# Patient Record
Sex: MALE | Race: WHITE | NOT HISPANIC OR LATINO | Employment: FULL TIME | URBAN - METROPOLITAN AREA
[De-identification: names, ages, dates, MRNs, and addresses within clinical notes are randomized per-mention and may not be internally consistent; named-entity substitution may affect disease eponyms.]

---

## 2023-11-24 ENCOUNTER — HOSPITAL ENCOUNTER (EMERGENCY)
Facility: HOSPITAL | Age: 58
Discharge: HOME | End: 2023-11-24
Attending: EMERGENCY MEDICINE | Admitting: INTERNAL MEDICINE
Payer: COMMERCIAL

## 2023-11-24 VITALS
HEIGHT: 74 IN | RESPIRATION RATE: 20 BRPM | SYSTOLIC BLOOD PRESSURE: 137 MMHG | WEIGHT: 195 LBS | OXYGEN SATURATION: 95 % | BODY MASS INDEX: 25.03 KG/M2 | DIASTOLIC BLOOD PRESSURE: 78 MMHG | HEART RATE: 102 BPM | TEMPERATURE: 98 F

## 2023-11-24 DIAGNOSIS — R33.8 ACUTE URINARY RETENTION: Primary | ICD-10-CM

## 2023-11-24 DIAGNOSIS — R07.9 CHEST PAIN: ICD-10-CM

## 2023-11-24 DIAGNOSIS — R10.9 ABDOMINAL PAIN: ICD-10-CM

## 2023-11-24 DIAGNOSIS — R10.2 PELVIC PAIN: ICD-10-CM

## 2023-11-24 LAB
ALBUMIN SERPL BCP-MCNC: 4.3 G/DL (ref 3.5–5.2)
ALBUMIN SERPL BCP-MCNC: 4.6 G/DL (ref 3.5–5.2)
ALP SERPL-CCNC: 78 U/L (ref 55–135)
ALP SERPL-CCNC: 83 U/L (ref 55–135)
ALT SERPL W/O P-5'-P-CCNC: 32 U/L (ref 10–44)
ALT SERPL W/O P-5'-P-CCNC: 35 U/L (ref 10–44)
AMPHET+METHAMPHET UR QL: NEGATIVE
ANION GAP SERPL CALC-SCNC: 12 MMOL/L (ref 8–16)
ANION GAP SERPL CALC-SCNC: 13 MMOL/L (ref 8–16)
AST SERPL-CCNC: 26 U/L (ref 10–40)
AST SERPL-CCNC: 26 U/L (ref 10–40)
BACTERIA #/AREA URNS HPF: NEGATIVE /HPF
BARBITURATES UR QL SCN>200 NG/ML: NEGATIVE
BASOPHILS # BLD AUTO: 0.05 K/UL (ref 0–0.2)
BASOPHILS NFR BLD: 0.5 % (ref 0–1.9)
BENZODIAZ UR QL SCN>200 NG/ML: NEGATIVE
BILIRUB SERPL-MCNC: 0.3 MG/DL (ref 0.1–1)
BILIRUB SERPL-MCNC: 0.5 MG/DL (ref 0.1–1)
BILIRUB UR QL STRIP: NEGATIVE
BNP SERPL-MCNC: 39 PG/ML (ref 0–99)
BUN SERPL-MCNC: 20 MG/DL (ref 6–20)
BUN SERPL-MCNC: 21 MG/DL (ref 6–20)
BZE UR QL SCN: NEGATIVE
CALCIUM SERPL-MCNC: 10.1 MG/DL (ref 8.7–10.5)
CALCIUM SERPL-MCNC: 9.3 MG/DL (ref 8.7–10.5)
CANNABINOIDS UR QL SCN: NEGATIVE
CHLORIDE SERPL-SCNC: 108 MMOL/L (ref 95–110)
CHLORIDE SERPL-SCNC: 111 MMOL/L (ref 95–110)
CK SERPL-CCNC: 66 U/L (ref 20–200)
CLARITY UR: CLEAR
CO2 SERPL-SCNC: 18 MMOL/L (ref 23–29)
CO2 SERPL-SCNC: 18 MMOL/L (ref 23–29)
COLOR UR: YELLOW
CREAT SERPL-MCNC: 0.9 MG/DL (ref 0.5–1.4)
CREAT SERPL-MCNC: 0.9 MG/DL (ref 0.5–1.4)
CREAT SERPL-MCNC: 1.1 MG/DL (ref 0.5–1.4)
CREAT UR-MCNC: 140.4 MG/DL (ref 23–375)
DIFFERENTIAL METHOD: ABNORMAL
EOSINOPHIL # BLD AUTO: 0.1 K/UL (ref 0–0.5)
EOSINOPHIL NFR BLD: 0.7 % (ref 0–8)
ERYTHROCYTE [DISTWIDTH] IN BLOOD BY AUTOMATED COUNT: 12.5 % (ref 11.5–14.5)
EST. GFR  (NO RACE VARIABLE): >60 ML/MIN/1.73 M^2
EST. GFR  (NO RACE VARIABLE): >60 ML/MIN/1.73 M^2
GLUCOSE SERPL-MCNC: 115 MG/DL (ref 70–110)
GLUCOSE SERPL-MCNC: 130 MG/DL (ref 70–110)
GLUCOSE UR QL STRIP: NEGATIVE
HCT VFR BLD AUTO: 43.2 % (ref 40–54)
HGB BLD-MCNC: 14.7 G/DL (ref 14–18)
HGB UR QL STRIP: ABNORMAL
HYALINE CASTS #/AREA URNS LPF: 2 /LPF
IMM GRANULOCYTES # BLD AUTO: 0.07 K/UL (ref 0–0.04)
IMM GRANULOCYTES NFR BLD AUTO: 0.7 % (ref 0–0.5)
INFLUENZA A, MOLECULAR: NEGATIVE
INFLUENZA B, MOLECULAR: NEGATIVE
KETONES UR QL STRIP: NEGATIVE
LACTATE SERPL-SCNC: 1.9 MMOL/L (ref 0.5–1.9)
LACTATE SERPL-SCNC: 2.3 MMOL/L (ref 0.5–1.9)
LEUKOCYTE ESTERASE UR QL STRIP: NEGATIVE
LIPASE SERPL-CCNC: 20 U/L (ref 4–60)
LYMPHOCYTES # BLD AUTO: 0.9 K/UL (ref 1–4.8)
LYMPHOCYTES NFR BLD: 9.2 % (ref 18–48)
MAGNESIUM SERPL-MCNC: 1.9 MG/DL (ref 1.6–2.6)
MCH RBC QN AUTO: 29.8 PG (ref 27–31)
MCHC RBC AUTO-ENTMCNC: 34 G/DL (ref 32–36)
MCV RBC AUTO: 88 FL (ref 82–98)
MICROSCOPIC COMMENT: ABNORMAL
MONOCYTES # BLD AUTO: 0.5 K/UL (ref 0.3–1)
MONOCYTES NFR BLD: 4.7 % (ref 4–15)
NEUTROPHILS # BLD AUTO: 8.5 K/UL (ref 1.8–7.7)
NEUTROPHILS NFR BLD: 84.2 % (ref 38–73)
NITRITE UR QL STRIP: NEGATIVE
NRBC BLD-RTO: 0 /100 WBC
OPIATES UR QL SCN: ABNORMAL
PCP UR QL SCN>25 NG/ML: NEGATIVE
PH UR STRIP: 6 [PH] (ref 5–8)
PLATELET # BLD AUTO: 253 K/UL (ref 150–450)
PMV BLD AUTO: 10.6 FL (ref 9.2–12.9)
POTASSIUM SERPL-SCNC: 3.8 MMOL/L (ref 3.5–5.1)
POTASSIUM SERPL-SCNC: 3.9 MMOL/L (ref 3.5–5.1)
PROT SERPL-MCNC: 7 G/DL (ref 6–8.4)
PROT SERPL-MCNC: 7.7 G/DL (ref 6–8.4)
PROT UR QL STRIP: ABNORMAL
RBC # BLD AUTO: 4.93 M/UL (ref 4.6–6.2)
RBC #/AREA URNS HPF: 19 /HPF (ref 0–4)
SAMPLE: NORMAL
SODIUM SERPL-SCNC: 139 MMOL/L (ref 136–145)
SODIUM SERPL-SCNC: 141 MMOL/L (ref 136–145)
SP GR UR STRIP: >1.03 (ref 1–1.03)
SPECIMEN SOURCE: NORMAL
SQUAMOUS #/AREA URNS HPF: 0 /HPF
TOXICOLOGY INFORMATION: ABNORMAL
TROPONIN I SERPL HS-MCNC: 2.6 PG/ML (ref 0–14.9)
TROPONIN I SERPL HS-MCNC: 3.5 PG/ML (ref 0–14.9)
TSH SERPL DL<=0.005 MIU/L-ACNC: 0.84 UIU/ML (ref 0.34–5.6)
URN SPEC COLLECT METH UR: ABNORMAL
UROBILINOGEN UR STRIP-ACNC: NEGATIVE EU/DL
WBC # BLD AUTO: 10.07 K/UL (ref 3.9–12.7)
WBC #/AREA URNS HPF: 2 /HPF (ref 0–5)

## 2023-11-24 PROCEDURE — 83605 ASSAY OF LACTIC ACID: CPT | Mod: 91 | Performed by: EMERGENCY MEDICINE

## 2023-11-24 PROCEDURE — 85025 COMPLETE CBC W/AUTO DIFF WBC: CPT | Performed by: EMERGENCY MEDICINE

## 2023-11-24 PROCEDURE — 96361 HYDRATE IV INFUSION ADD-ON: CPT

## 2023-11-24 PROCEDURE — 80307 DRUG TEST PRSMV CHEM ANLYZR: CPT | Performed by: EMERGENCY MEDICINE

## 2023-11-24 PROCEDURE — 36415 COLL VENOUS BLD VENIPUNCTURE: CPT | Performed by: EMERGENCY MEDICINE

## 2023-11-24 PROCEDURE — 83690 ASSAY OF LIPASE: CPT | Performed by: EMERGENCY MEDICINE

## 2023-11-24 PROCEDURE — 63600175 PHARM REV CODE 636 W HCPCS: Performed by: EMERGENCY MEDICINE

## 2023-11-24 PROCEDURE — 25500020 PHARM REV CODE 255: Performed by: EMERGENCY MEDICINE

## 2023-11-24 PROCEDURE — 83880 ASSAY OF NATRIURETIC PEPTIDE: CPT | Performed by: EMERGENCY MEDICINE

## 2023-11-24 PROCEDURE — 11000001 HC ACUTE MED/SURG PRIVATE ROOM

## 2023-11-24 PROCEDURE — 81001 URINALYSIS AUTO W/SCOPE: CPT | Mod: 59 | Performed by: EMERGENCY MEDICINE

## 2023-11-24 PROCEDURE — 87040 BLOOD CULTURE FOR BACTERIA: CPT | Mod: 59 | Performed by: EMERGENCY MEDICINE

## 2023-11-24 PROCEDURE — 99285 EMERGENCY DEPT VISIT HI MDM: CPT | Mod: 25

## 2023-11-24 PROCEDURE — 99447 NTRPROF PH1/NTRNET/EHR 11-20: CPT | Mod: ,,, | Performed by: UROLOGY

## 2023-11-24 PROCEDURE — 93010 ELECTROCARDIOGRAM REPORT: CPT | Mod: ,,, | Performed by: GENERAL PRACTICE

## 2023-11-24 PROCEDURE — 80053 COMPREHEN METABOLIC PANEL: CPT | Mod: 91 | Performed by: EMERGENCY MEDICINE

## 2023-11-24 PROCEDURE — 96376 TX/PRO/DX INJ SAME DRUG ADON: CPT | Mod: 59

## 2023-11-24 PROCEDURE — 84443 ASSAY THYROID STIM HORMONE: CPT | Performed by: EMERGENCY MEDICINE

## 2023-11-24 PROCEDURE — 84484 ASSAY OF TROPONIN QUANT: CPT | Mod: 91 | Performed by: EMERGENCY MEDICINE

## 2023-11-24 PROCEDURE — 82565 ASSAY OF CREATININE: CPT

## 2023-11-24 PROCEDURE — 93005 ELECTROCARDIOGRAM TRACING: CPT | Performed by: GENERAL PRACTICE

## 2023-11-24 PROCEDURE — 93010 EKG 12-LEAD: ICD-10-PCS | Mod: ,,, | Performed by: GENERAL PRACTICE

## 2023-11-24 PROCEDURE — 99447 PR INTERPROF, PHONE/INTERNET/EHR, CONSULT, 11-20 MINS: ICD-10-PCS | Mod: ,,, | Performed by: UROLOGY

## 2023-11-24 PROCEDURE — 25000003 PHARM REV CODE 250: Performed by: EMERGENCY MEDICINE

## 2023-11-24 PROCEDURE — 82550 ASSAY OF CK (CPK): CPT | Performed by: EMERGENCY MEDICINE

## 2023-11-24 PROCEDURE — 96365 THER/PROPH/DIAG IV INF INIT: CPT | Mod: 59

## 2023-11-24 PROCEDURE — 96375 TX/PRO/DX INJ NEW DRUG ADDON: CPT

## 2023-11-24 PROCEDURE — 87502 INFLUENZA DNA AMP PROBE: CPT | Performed by: EMERGENCY MEDICINE

## 2023-11-24 PROCEDURE — 83735 ASSAY OF MAGNESIUM: CPT | Performed by: EMERGENCY MEDICINE

## 2023-11-24 PROCEDURE — 51798 US URINE CAPACITY MEASURE: CPT

## 2023-11-24 RX ORDER — HYDROMORPHONE HYDROCHLORIDE 1 MG/ML
1 INJECTION, SOLUTION INTRAMUSCULAR; INTRAVENOUS; SUBCUTANEOUS
Status: COMPLETED | OUTPATIENT
Start: 2023-11-24 | End: 2023-11-24

## 2023-11-24 RX ORDER — NALOXONE HCL 0.4 MG/ML
0.02 VIAL (ML) INJECTION
Status: DISCONTINUED | OUTPATIENT
Start: 2023-11-24 | End: 2023-11-24 | Stop reason: HOSPADM

## 2023-11-24 RX ORDER — IBUPROFEN 200 MG
24 TABLET ORAL
Status: DISCONTINUED | OUTPATIENT
Start: 2023-11-24 | End: 2023-11-24 | Stop reason: HOSPADM

## 2023-11-24 RX ORDER — ONDANSETRON 2 MG/ML
4 INJECTION INTRAMUSCULAR; INTRAVENOUS
Status: COMPLETED | OUTPATIENT
Start: 2023-11-24 | End: 2023-11-24

## 2023-11-24 RX ORDER — CEFUROXIME AXETIL 500 MG/1
500 TABLET ORAL 2 TIMES DAILY
Qty: 20 TABLET | Refills: 0 | Status: SHIPPED | OUTPATIENT
Start: 2023-11-24 | End: 2023-12-04

## 2023-11-24 RX ORDER — GLUCAGON 1 MG
1 KIT INJECTION
Status: DISCONTINUED | OUTPATIENT
Start: 2023-11-24 | End: 2023-11-24 | Stop reason: HOSPADM

## 2023-11-24 RX ORDER — SODIUM CHLORIDE 0.9 % (FLUSH) 0.9 %
10 SYRINGE (ML) INJECTION EVERY 12 HOURS PRN
Status: DISCONTINUED | OUTPATIENT
Start: 2023-11-24 | End: 2023-11-24 | Stop reason: HOSPADM

## 2023-11-24 RX ORDER — HYDROCODONE BITARTRATE AND ACETAMINOPHEN 5; 325 MG/1; MG/1
1 TABLET ORAL EVERY 8 HOURS PRN
Qty: 6 TABLET | Refills: 0 | Status: SHIPPED | OUTPATIENT
Start: 2023-11-24

## 2023-11-24 RX ORDER — DIPHENHYDRAMINE HYDROCHLORIDE 50 MG/ML
25 INJECTION INTRAMUSCULAR; INTRAVENOUS
Status: COMPLETED | OUTPATIENT
Start: 2023-11-24 | End: 2023-11-24

## 2023-11-24 RX ORDER — IBUPROFEN 200 MG
16 TABLET ORAL
Status: DISCONTINUED | OUTPATIENT
Start: 2023-11-24 | End: 2023-11-24 | Stop reason: HOSPADM

## 2023-11-24 RX ORDER — DROPERIDOL 2.5 MG/ML
1.25 INJECTION, SOLUTION INTRAMUSCULAR; INTRAVENOUS ONCE
Status: COMPLETED | OUTPATIENT
Start: 2023-11-24 | End: 2023-11-24

## 2023-11-24 RX ORDER — ONDANSETRON 4 MG/1
4 TABLET, ORALLY DISINTEGRATING ORAL EVERY 6 HOURS PRN
Qty: 9 TABLET | Refills: 0 | Status: SHIPPED | OUTPATIENT
Start: 2023-11-24

## 2023-11-24 RX ADMIN — IOHEXOL 100 ML: 350 INJECTION, SOLUTION INTRAVENOUS at 03:11

## 2023-11-24 RX ADMIN — ONDANSETRON 4 MG: 2 INJECTION INTRAMUSCULAR; INTRAVENOUS at 02:11

## 2023-11-24 RX ADMIN — SODIUM CHLORIDE 1000 ML: 0.9 INJECTION, SOLUTION INTRAVENOUS at 02:11

## 2023-11-24 RX ADMIN — HYDROMORPHONE HYDROCHLORIDE 1 MG: 1 INJECTION, SOLUTION INTRAMUSCULAR; INTRAVENOUS; SUBCUTANEOUS at 02:11

## 2023-11-24 RX ADMIN — HYDROMORPHONE HYDROCHLORIDE 1 MG: 1 INJECTION, SOLUTION INTRAMUSCULAR; INTRAVENOUS; SUBCUTANEOUS at 05:11

## 2023-11-24 RX ADMIN — PIPERACILLIN SODIUM AND TAZOBACTAM SODIUM 4.5 G: 4; .5 INJECTION, POWDER, LYOPHILIZED, FOR SOLUTION INTRAVENOUS at 05:11

## 2023-11-24 RX ADMIN — SODIUM CHLORIDE 500 ML: 0.9 INJECTION, SOLUTION INTRAVENOUS at 05:11

## 2023-11-24 RX ADMIN — DIPHENHYDRAMINE HYDROCHLORIDE 25 MG: 50 INJECTION INTRAMUSCULAR; INTRAVENOUS at 06:11

## 2023-11-24 RX ADMIN — DROPERIDOL 1.25 MG: 2.5 INJECTION, SOLUTION INTRAMUSCULAR; INTRAVENOUS at 06:11

## 2023-11-24 NOTE — ED NOTES
Pt present with severe pain, diaphoresis. Pt is continuously grunting and stating they are in pain.

## 2023-11-24 NOTE — Clinical Note
Diagnosis: Abdominal pain [[id number]]   Future Attending Provider: DAIANA DENG [[id number]]   Admitting Provider:: DAIANA DENG [[id number]]   Admit to which facility:: [facility] [[id number]]   Reason for IP Medical Treatment  (Clinical interventions that can only be accomplished in the IP setting? ) :: Intractable pain   I certify that Inpatient services for greater than or equal to 2 midnights are medically necessary:: Yes   Plans for Post-Acute care--if anticipated (pick the single best option):: A. No post acute care anticipated at this time

## 2023-11-24 NOTE — ED PROVIDER NOTES
Encounter Date: 11/24/2023       History     Chief Complaint   Patient presents with    Groin Pain     Had penile implant revision 10/30, but is now having severe bilateral groin and lower abdominal pain     Abdominal Pain     The history is provided by the patient and a relative.   Abdominal Pain  The current episode started 3 to 5 hours ago. The onset of the illness was abrupt. The abdominal pain is generalized (Generalized to the lower abdominal area). The abdominal pain does not radiate. Pain scale: Patient rates pain as being severe. The abdominal pain is relieved by nothing. The other symptoms of the illness do not include fever, fatigue, jaundice, melena, shortness of breath, nausea, vomiting, diarrhea, dysuria, hematemesis, hematochezia or vaginal discharge.   The patient has not had a change in bowel habit. Symptoms associated with the illness do not include chills, anorexia, heartburn, constipation, urgency, hematuria, frequency or back pain.   The patient reports that he suddenly developed diffuse lower abdominal pain about 5-6 hours prior to arrival.  Patient states the pain was abrupt in onset.  He denies any recent illnesses.  He denies any dysuria, frequency or urgency.  He reports his bowel movements have been increased in frequency but has been formed.  Denies constipation or diarrhea.  Denies melena or hematochezia.  The patient had a penile prosthesis which was placed on October 30th along with a bladder sphincter.  Prior to that he would had a prostatectomy for prostate cancer.  He denies any penile or testicular pain or discomfort.  He denies any associated flank pain chest pain or shortness of breath.  He denies any other problems or complaints.  Review of patient's allergies indicates:  No Known Allergies  No past medical history on file.  No past surgical history on file.  No family history on file.     Review of Systems   Constitutional:  Negative for activity change, appetite change,  chills, fatigue, fever and unexpected weight change.   HENT: Negative.  Negative for congestion, dental problem, ear pain, nosebleeds, rhinorrhea, sinus pain, sore throat, trouble swallowing and voice change.    Eyes: Negative.  Negative for pain and visual disturbance.   Respiratory: Negative.  Negative for cough, chest tightness, shortness of breath and wheezing.    Cardiovascular: Negative.  Negative for chest pain, palpitations and leg swelling.   Gastrointestinal:  Positive for abdominal pain. Negative for anorexia, blood in stool, constipation, diarrhea, heartburn, hematemesis, hematochezia, jaundice, melena, nausea and vomiting.   Endocrine: Negative.    Genitourinary: Negative.  Negative for decreased urine volume, difficulty urinating, dysuria, flank pain, frequency, genital sores, hematuria, penile discharge, penile pain, penile swelling, scrotal swelling, testicular pain, urgency and vaginal discharge.   Musculoskeletal: Negative.  Negative for arthralgias, back pain, gait problem, joint swelling, myalgias, neck pain and neck stiffness.   Skin: Negative.  Negative for pallor and rash.   Neurological: Negative.  Negative for dizziness, seizures, syncope, facial asymmetry, speech difficulty, weakness, light-headedness, numbness and headaches.   Hematological: Negative.  Does not bruise/bleed easily.   Psychiatric/Behavioral: Negative.  Negative for confusion.    All other systems reviewed and are negative.      Physical Exam     Initial Vitals [11/24/23 1240]   BP Pulse Resp Temp SpO2   (!) 189/101 76 18 98.1 °F (36.7 °C) 98 %      MAP       --         Physical Exam    Nursing note and vitals reviewed.  Constitutional: He is diaphoretic. He is cooperative.  Non-toxic appearance. He does not have a sickly appearance. He appears ill. He appears distressed.   HENT:   Head: Normocephalic and atraumatic.   Right Ear: Tympanic membrane normal.   Left Ear: Tympanic membrane normal.   Nose: Nose normal. No mucosal  edema or rhinorrhea.   Mouth/Throat: Uvula is midline, oropharynx is clear and moist and mucous membranes are normal. No oral lesions. No trismus in the jaw. No uvula swelling. No oropharyngeal exudate, posterior oropharyngeal edema, posterior oropharyngeal erythema or tonsillar abscesses.   Eyes: Conjunctivae, EOM and lids are normal. Pupils are equal, round, and reactive to light. Right eye exhibits no discharge. Left eye exhibits no discharge. No scleral icterus.   Neck: Trachea normal and phonation normal. Neck supple. No thyromegaly present. No stridor present. No tracheal deviation present. No JVD present.   Normal range of motion.   Full passive range of motion without pain.     Cardiovascular:  Normal rate, regular rhythm, normal heart sounds, intact distal pulses and normal pulses.     Exam reveals no gallop, no distant heart sounds, no friction rub and no decreased pulses.       No murmur heard.  Pulmonary/Chest: Effort normal and breath sounds normal. No stridor. No respiratory distress. He has no decreased breath sounds. He has no wheezes. He has no rhonchi. He has no rales.   Abdominal: Abdomen is soft. Bowel sounds are normal. He exhibits no distension, no pulsatile midline mass and no mass. There is abdominal tenderness in the right lower quadrant, suprapubic area and left lower quadrant. Hernia confirmed negative in the right inguinal area and confirmed negative in the left inguinal area.   No right CVA tenderness.There is guarding. There is no rebound. negative psoas sign and negative Rovsing's sign  Genitourinary:    Testes and penis normal.   Right testis shows no mass, no swelling and no tenderness. Left testis shows no mass, no swelling and no tenderness.   Musculoskeletal:         General: No tenderness or edema. Normal range of motion.      Right hand: Normal. No tenderness or bony tenderness. Normal range of motion. Normal strength. Normal sensation. Normal capillary refill. Normal pulse.       Left hand: Normal. No tenderness or bony tenderness. Normal range of motion. Normal strength. Normal sensation. Normal capillary refill. Normal pulse.      Cervical back: Normal, full passive range of motion without pain, normal range of motion and neck supple. No swelling, edema, erythema, rigidity, tenderness or bony tenderness. No pain with movement, spinous process tenderness or muscular tenderness. Normal range of motion and normal range of motion. Normal.      Thoracic back: Normal. No swelling, tenderness or bony tenderness. Normal range of motion.      Lumbar back: Normal. No swelling, edema, tenderness or bony tenderness. Normal range of motion.      Right foot: Normal. Normal range of motion and normal capillary refill. No swelling, tenderness or bony tenderness. Normal pulse.      Left foot: Normal. Normal range of motion and normal capillary refill. No swelling, tenderness or bony tenderness. Normal pulse.      Comments: Pulses are 2+ throughout, cap refill is less than 2 sec throughout, no edema noted, extremities are nontender throughout with full range of motion     Lymphadenopathy:     He has no cervical adenopathy. No inguinal adenopathy noted on the right or left side.   Neurological: He is alert and oriented to person, place, and time. He has normal strength. No cranial nerve deficit or sensory deficit. Coordination and gait normal. GCS score is 15. GCS eye subscore is 4. GCS verbal subscore is 5. GCS motor subscore is 6.   No focal deficits   Skin: Skin is warm. Capillary refill takes less than 2 seconds. No ecchymosis, no petechiae and no rash noted. No cyanosis or erythema. No pallor.   Psychiatric: He has a normal mood and affect. His speech is normal and behavior is normal. Judgment and thought content normal. Cognition and memory are normal.         ED Course   Procedures  Labs Reviewed   CBC W/ AUTO DIFFERENTIAL - Abnormal; Notable for the following components:       Result Value     Immature Granulocytes 0.7 (*)     Gran # (ANC) 8.5 (*)     Immature Grans (Abs) 0.07 (*)     Lymph # 0.9 (*)     Gran % 84.2 (*)     Lymph % 9.2 (*)     All other components within normal limits   COMPREHENSIVE METABOLIC PANEL - Abnormal; Notable for the following components:    CO2 18 (*)     Glucose 115 (*)     BUN 21 (*)     All other components within normal limits   LACTIC ACID, PLASMA - Abnormal; Notable for the following components:    Lactate (Lactic Acid) 2.3 (*)     All other components within normal limits   DRUG SCREEN PANEL, URINE EMERGENCY - Abnormal; Notable for the following components:    Opiate Scrn, Ur Presumptive Positive (*)     All other components within normal limits    Narrative:     Specimen Source->Urine   URINALYSIS, REFLEX TO URINE CULTURE - Abnormal; Notable for the following components:    Specific Gravity, UA >1.030 (*)     Protein, UA Trace (*)     Occult Blood UA 3+ (*)     All other components within normal limits    Narrative:     Specimen Source->Urine   URINALYSIS MICROSCOPIC - Abnormal; Notable for the following components:    RBC, UA 19 (*)     Hyaline Casts, UA 2 (*)     All other components within normal limits    Narrative:     Specimen Source->Urine   COMPREHENSIVE METABOLIC PANEL - Abnormal; Notable for the following components:    Chloride 111 (*)     CO2 18 (*)     Glucose 130 (*)     All other components within normal limits   CULTURE, BLOOD   CULTURE, BLOOD    Narrative:     Collection has been rescheduled by [facility] at 11/24/2023 14:53 Reason:   Done  Collection has been rescheduled by [facility] at 11/24/2023 14:53 Reason:   Done   MAGNESIUM   TROPONIN I HIGH SENSITIVITY   TROPONIN I HIGH SENSITIVITY   B-TYPE NATRIURETIC PEPTIDE   LIPASE   CK   TSH   INFLUENZA A AND B ANTIGEN    Narrative:     Specimen Source->Nasopharyngeal Swab   LACTIC ACID, PLASMA   ISTAT CREATININE   POCT CREATININE        ECG Results              EKG 12-lead (In process)  Result time 11/24/23 16:03:33       In process by Interface, Lab In [facility] (11/24/23 16:03:33)                   Narrative:    Test Reason : R10.9,    Vent. Rate : 064 BPM     Atrial Rate : 064 BPM     P-R Int : 168 ms          QRS Dur : 084 ms      QT Int : 402 ms       P-R-T Axes : 040 078 071 degrees     QTc Int : 414 ms    Normal sinus rhythm  Septal infarct ,age undetermined  Abnormal ECG  No previous ECGs available    Referred By: [email or web address]   SELF           Confirmed By:                                   Imaging Results              US Scrotum And Testicles (Final result)  Result time 11/24/23 16:34:37      Final result by Isrrael Grove MD (11/24/23 16:34:37)                   Narrative:    REASON: Pain.    TECHNIQUE: Grayscale and color Doppler ultrasound of the scrotum and testicles.    COMPARISON: CT abdomen and pelvis November 24, 2023    FINDINGS:    Right testicle measures 4.4 x 2.2 x 2.4 cm. Left testicle measures 4.2 x 2.2 x 2.7 cm. No testicular mass or lesion identified. Normal vascularity of the testicles demonstrated. Mild prominence of the left epididymis. No hydrocele or varicocele observed.    IMPRESSION:    Unremarkable ultrasound of the testicles.    Electronically signed by:  Isrrael Grove DO  11/24/2023 04:34 PM CST Workstation: [id number]                                     CT Abdomen Pelvis With IV Contrast NO Oral Contrast (Final result)  Result time 11/24/23 15:23:22      Final result by Kanwal Villanueva MD (11/24/23 15:23:22)                   Narrative:    EXAMINATION:  CT ABDOMEN PELVIS WITH IV CONTRAST    CLINICAL INDICATION: Male, 58 years old. LLQ abdominal pain groin pain after a penile implant revision on 10/30/2023    TECHNIQUE: Helical CT scan examination of the abdomen and pelvis is performed from the domes of the diaphragm to the pubic symphysis with the administration of intravenous contrast material.    CONTRAST: 100 mL mL of Omnipaque 350 IV.    COMPARISON: None    FINDINGS:  Lower Chest:  Visualized lung bases are clear. Heart is normal in size. No pericardial or pleural effusion.    Liver: Normal in size and contour. No focal lesion.    Bile Ducts: Normal caliber.    Gallbladder: No stones, wall thickening, or pericholecystic fluid.    Pancreas: Normal appearance without focal lesion.    Spleen: Normal in size and contour.    Adrenals: Normal configuration.    Kidneys and Ureters: Normal size and contour. There is a 2 mm stone in the mid left kidney. There is no hydronephrosis. The ureters are unremarkable.    Bladder: Normal in appearance.    Bowel:  Stomach: Unremarkable.  Small Intestine: Unremarkable.  Appendix: No inflammatory changes.  Colon: There are no thick-walled or dilated bowel loops.    Vessels: Abdominal aorta and inferior vena cava are normal in course and caliber.    Reproductive Organs: Prostate gland is absent    Lymph Nodes: No pathologic mesenteric or retroperitoneal lymph nodes.    Peritoneum: There is no free fluid or free air.  There is a penile implant with the reservoir anterior to the bladder. There is a 3.7 cm round fluid collection within the left inguinal canal consistent with penile implant reservoir.  Abdominal Wall: No hernia or mass.    Musculoskeletal: No acute abnormality or suspicious bony lesion.    IMPRESSION:  3.7 cm round fluid collection in the left inguinal canal consistent with penile prosthesis reservoir    2 mm left renal stone without hydronephrosis.      This exam was performed according to our departmental dose-optimization program which includes automated exposure control, adjustment of the mA and/or kV according to patient size and/or use of iterative reconstruction technique.    Electronically signed by:  Kanwal Villanueva MD  11/24/2023 03:23 PM CST Workstation: [id number]                                     CTA Chest Non-Coronary (PE Studies) (Final result)  Result time 11/24/23 15:13:55      Final result by Kanwal Villanueva MD (11/24/23  15:13:55)                   Narrative:    All CT scans at this facility used dose modulation, iterative reconstruction and/or weight-based dosing when appropriate to reduce radiation doses  as low as reasonably achievable.    HISTORY: Groin pain. End abdominal pain    FINDINGS: Thin axial imaging through the chest was performed with 100 mL Omnipaque 350 IV contrast, with sagittal and coronal reformatted images and 3-D reconstructions performed on an independent workstation, with images stored in the patient's permanent electronic medical record.    This is a high-grade quality study for the evaluation of pulmonary embolism. The pulmonary arteries are normal in appearance without pulmonary emboli noted up to the subsegmental level, noting limitations of CT technique for identifying small isolated subsegmental emboli.    The heart is normal in size. There is coronary artery calcification. Aorta is normal in caliber.  There is no hilar, mediastinal or axillary adenopathy.    LUNGS: No pulmonary nodules, infiltrates or pleural effusions. The trachea and bronchi are normal.  There is a small hiatal hernia.  The chest wall is unremarkable.  The visualized portion of the upper abdomen is unremarkable there are no acute osseous abnormalities.      IMPRESSION: No CT evidence pulmonary emboli    Small hiatal hernia    No acute pulmonary process.    Electronically signed by:  Kanwal Villanueva MD  11/24/2023 03:13 PM CST Workstation: [id number]                                     X-Ray Chest AP Portable (Final result)  Result time 11/24/23 14:21:45      Final result by Kanwal Villanueva MD (11/24/23 14:21:45)                   Narrative:    XR CHEST 1 VIEW    CLINICAL HISTORY:  58 years Male abd pain    COMPARISON: None    FINDINGS: Cardiomediastinal silhouette is within normal limits. Lungs are normally expanded with no airspace consolidation. No pleural effusion or pneumothorax. No acute osseous abnormality.    IMPRESSION:  No acute pulmonary process.    Electronically signed by:  Kanwal Villanueva MD  11/24/2023 02:21 PM CST Workstation: [id number]                                     Medications   sodium chloride 0.9% flush 10 mL (has no administration in time range)   naloxone 0.4 mg/mL injection 0.02 mg (has no administration in time range)   glucose chewable tablet 16 g (has no administration in time range)   glucose chewable tablet 24 g (has no administration in time range)   dextrose 50% injection 12.5 g (has no administration in time range)   dextrose 50% injection 25 g (has no administration in time range)   glucagon (human recombinant) injection 1 mg (has no administration in time range)   HYDROmorphone injection 1 mg (1 mg Intravenous Given 11/24/23 1402)   ondansetron injection 4 mg (4 mg Intravenous Given 11/24/23 1402)   sodium chloride 0.9% bolus 1,000 mL 1,000 mL (0 mLs Intravenous Stopped 11/24/23 1539)   piperacillin-tazobactam 4.5 g in dextrose 5 % 100 mL IVPB (ready to mix) (0 g Intravenous Stopped 11/24/23 1731)   HYDROmorphone injection 1 mg (1 mg Intravenous Given 11/24/23 1436)   iohexoL (OMNIPAQUE 350) injection 100 mL (100 mLs Intravenous Given 11/24/23 1508)   sodium chloride 0.9% bolus 500 mL 500 mL (0 mLs Intravenous Stopped 11/24/23 1836)   HYDROmorphone injection 1 mg (1 mg Intravenous Given 11/24/23 1738)   droPERidol injection 1.25 mg (1.25 mg Intravenous Given 11/24/23 1838)   diphenhydrAMINE injection 25 mg (25 mg Intravenous Given 11/24/23 1837)     Medical Decision Making  CT scan with no evidence of acute intra-abdominal abnormality.  Pain did reoccur several times requiring several doses of pain medicines.  After my original assessment secondary to the amount of abdominal tenderness noted I had consulted and discussed the case with General surgery on-call.  Diagnostic plan and course of treatment discussed with Dr. Barney who agreed, will rediscuss case with him after lab findings and CT scan have  returned.  After CT scan returned I reassessed the patient.  He was feeling better.  CT findings were rediscussed with Dr. Barney.  As it was uncertain what was causing the patient's discomfort, we discussed the possibility of admission to the hospital.  Bladder scan was completed as ordered.  Patient with 670 cc of retained urine.  States he did not have a sensation that he was retaining urine.  I discussed this through secure chat with Dr. Silva on-call for urology.  She recommended that we attempt to open the bladder sphincter to completely empty the bladder as opposed to catheterization.  The patient then re-attempted to access the sphincter and was over time able to completely empty 600 cc.  He feels that he likely has not been completely emptying his bladder as he has not been allowing the slow flow to completely empty.  His symptoms have now resolved completely.  He had been evaluated by the hospitalist physician regarding admission.  He currently does not want to be admitted to the hospital and wants to go home and follow-up with his own urologist.  I did explain that we would repeat lab test as his lactic acid and his serum bicarbonate level was somewhat low.  White blood cell count was not elevated however.  The patient voices understanding.  He does want to go home and states he feels completely back to normal.  He is currently declining admission.  Have discussed need for very close outpatient evaluation with his primary care provider as well as his urologist.  He will be placed on Ceftin for urinary prophylaxis in light of current symptoms.  Symptomatic supportive care discussed, have discussed importance of completely draining his bladder until there is no flow every 2-3 hours.  Patient voices understanding.  I have also encouraged him to return here or go to the closest emergency room on his route home immediately if he has any worsening symptoms, new symptoms, any problems or concerns.   Patient voices understanding.  He is very thankful for his care.  He feels well and wants to go home.    Amount and/or Complexity of Data Reviewed  Labs: ordered.  Radiology: ordered.    Risk  Prescription drug management.  Decision regarding hospitalization.               ED Course as of 11/24/23 2112 Fri Nov 24, 2023 2056 Leukocytes, UA: Negative [AR]   2056 NITRITE UA: Negative [AR]   2056 WBC: 10.07 [AR]   2056 Hemoglobin: 14.7 [AR]   2056 Hematocrit: 43.2 [AR]   2056 Sodium: 141 [AR]   2056 CO2(!): 18 [AR]   2056 Glucose(!): 130 [AR]   2056 BUN: 20 [AR]   2056 Lactate, Dequan: 1.9 [AR]      ED Course User Index  [AR] Thao Abdullahi MD                        Clinical Impression:  Final diagnoses:  [R10.9] Abdominal pain  [R10.2] Pelvic pain  [R33.8] Acute urinary retention (Primary)          ED Disposition Condition    Discharge Stable          ED Prescriptions       Medication Sig Dispense Start Date End Date Auth. Provider    HYDROcodone-acetaminophen (NORCO) 5-325 mg per tablet Take 1 tablet by mouth every 8 (eight) hours as needed for Pain. 6 tablet 11/24/2023 -- Thao Abdullahi MD    cefUROXime (CEFTIN) 500 MG tablet Take 1 tablet (500 mg total) by mouth 2 (two) times a day. for 10 days 20 tablet 11/24/2023 12/4/2023 Thao Abdullahi MD    ondansetron (ZOFRAN-ODT) 4 MG TbDL Take 1 tablet (4 mg total) by mouth every 6 (six) hours as needed (nausea or vomiting). 9 tablet 11/24/2023 -- Thao Abdullahi MD          Follow-up Information       Follow up With Specialties Details Why Contact Info    Your urologist  Schedule an appointment as soon as possible for a visit in 2 days      Your primary care doctor                 Thao Abdullahi MD  11/24/23 2112

## 2023-11-25 NOTE — ED NOTES
Patient has an internal bladder sphincter/ stimulator, was told to hold off on placing a vargas catheter by Bradly CHAUDHRY. Bladder scan showing 607cc of urine post void.

## 2023-11-25 NOTE — DISCHARGE INSTRUCTIONS
Please read and follow discharge instructions and return precautions.  Rest, avoid any strenuous activity, over exertion or overheating.  Norco as directed if needed for pain, Zofran as directed if needed for nausea or vomiting.  Take a stool softener if constipation occurs.  Do not drive, drink alcohol or operate machinery while taking this medication as it may make you sleepy.  Ceftin as directed until completed.  Keep well hydrated.  Make certain that you are completely emptying your bladder every 2-3 hours.  Very important to see your urologist in your primary care provider in the next 1-2 days.  Return immediately if you develop new or worsening symptoms or if you have new problems or concerns.

## 2023-11-25 NOTE — CARE UPDATE
Interprofessional Telephone CONSULT Note  Date of Service: 11/25/2023  Patient's location: [facility] er     Specialty Consulted: Urology  Staff Consulted: Erika Silva MD  Reason for Consult: pain in left groin after ipp     Time spent reviewing records, making plan and formulating plan: 15 min. More than half the time was devoted to medical consultative verbal/internet discussion with nursing and consulting physician. The purpose of this note was to treat and evaluate patient and not to arrange a transfer of care or other face to face service.     This service was not originating from a related E/M service provided within the previous 7 days nor will  to an E/M service or procedure within the next 24 hours or my soonest available appointment.      Both a written plan and a verbal/internet discussion were discussed with the following physician consultant: MD Kike  82255 5-10 min  04042 11 to 20 min  58217 21 to 30 min  59658 31 min            Called by ER.  Patient visiting family.  Apparently had a sphincter and IPP [place] about [place] ago [place].  He was doing fine until he had an acute episode of pain in his left groin.  Has been having difficulty urinating since then.    He came to the ER and they did a CT and it showed a distended bladder and that the reservoir of the sphincter had moved into the left inguinal canal.    His white count was normal.  Creatinine was normal.  There was no evidence of any infection.    Had the urine tell him to cycle his sphincter and when he did this he was able to drain very slowly.  Hold then he would have to repeat this multiple times to fully empty his bladder.    Then said that he would need to follow up with his urologist in [place] to discuss whether or not they will need to reposition that is sphincter reservoir.  He should follow-up as soon as possible as he likely has pain from this although no urgent treatment needed currently    Also asked them  "to put his images on a CD for his surgeon to review    Ua with 3+bld/19 rbc- should also fu with urologist regarding MH (no stones seen on ct)      IPP reservoir:        AUS reservoir          Recent Labs   Lab 11/24/23  1406   WBC 10.07   Hemoglobin 14.7   Hematocrit 43.2   Platelets 253   ]  Recent Labs   Lab 11/24/23  1406 11/24/23 2022   Sodium 139 141   Potassium 3.8 3.9   Chloride 108 111 H   CO2 18 L 18 L   BUN 21 H 20   Creatinine 0.9 1.1   Glucose 115 H 130 H   Calcium 10.1 9.3   Magnesium 1.9  --    Alkaline Phosphatase 83 78   Total Protein 7.7 7.0   Albumin 4.6 4.3   Total Bilirubin 0.3 0.5   AST 26 26   ALT 35 32   ]    No results found for: "LABA1C", "HGBA1C"    "

## 2023-11-29 LAB
BACTERIA BLD CULT: NORMAL
BACTERIA BLD CULT: NORMAL